# Patient Record
Sex: FEMALE | URBAN - METROPOLITAN AREA
[De-identification: names, ages, dates, MRNs, and addresses within clinical notes are randomized per-mention and may not be internally consistent; named-entity substitution may affect disease eponyms.]

---

## 2022-07-22 ENCOUNTER — OFFICE VISIT (OUTPATIENT)
Facility: LOCATION | Age: 72
End: 2022-07-22
Payer: MEDICARE

## 2022-07-22 DIAGNOSIS — E11.9 TYPE 2 DIABETES MELLITUS W/O COMPLICATION: ICD-10-CM

## 2022-07-22 DIAGNOSIS — H25.13 AGE-RELATED NUCLEAR CATARACT, BILATERAL: ICD-10-CM

## 2022-07-22 DIAGNOSIS — H40.033 ANATOMICAL NARROW ANGLE, BILATERAL: Primary | ICD-10-CM

## 2022-07-22 PROCEDURE — 92133 CPTRZD OPH DX IMG PST SGM ON: CPT | Performed by: OPTOMETRIST

## 2022-07-22 PROCEDURE — 76514 ECHO EXAM OF EYE THICKNESS: CPT | Performed by: OPTOMETRIST

## 2022-07-22 PROCEDURE — 92004 COMPRE OPH EXAM NEW PT 1/>: CPT | Performed by: OPTOMETRIST

## 2022-07-22 ASSESSMENT — INTRAOCULAR PRESSURE
OS: 19
OD: 18

## 2022-07-22 ASSESSMENT — KERATOMETRY
OS: 45.00
OD: 45.50

## 2022-07-22 ASSESSMENT — VISUAL ACUITY
OD: 20/25
OS: 20/25

## 2022-07-22 NOTE — IMPRESSION/PLAN
Impression: Type 2 diabetes mellitus w/o complication: S13.5. Plan: Diabetes type II: no background retinopathy, no signs of neovascularization noted. Discussed ocular and systemic benefits of blood sugar control.

## 2022-07-22 NOTE — IMPRESSION/PLAN
Impression: Anatomical narrow angle, bilateral: H40.033.
-s/p LPI OU Plan: Condition and IOP are stable today. No changes being made to current treatment at this time. Continue Latanoprost QHS OU. Emphasized and explained compliance. Reassured patient of current condition and treatment and discussed risks of glaucoma progression. Will continue to monitor IOP. RTC: 6 months IOP check

## 2023-01-24 ENCOUNTER — OFFICE VISIT (OUTPATIENT)
Facility: LOCATION | Age: 73
End: 2023-01-24
Payer: MEDICARE

## 2023-01-24 DIAGNOSIS — H04.123 TEAR FILM INSUFFICIENCY OF BILATERAL LACRIMAL GLANDS: ICD-10-CM

## 2023-01-24 DIAGNOSIS — H40.2234 CHRONIC ANGLE CLOSURE, BILATERAL, INDETERMINATE STAGE: Primary | ICD-10-CM

## 2023-01-24 DIAGNOSIS — H25.13 AGE-RELATED NUCLEAR CATARACT, BILATERAL: ICD-10-CM

## 2023-01-24 PROCEDURE — 99213 OFFICE O/P EST LOW 20 MIN: CPT | Performed by: OPTOMETRIST

## 2023-01-24 ASSESSMENT — INTRAOCULAR PRESSURE
OS: 20
OD: 22

## 2023-01-24 NOTE — IMPRESSION/PLAN
Impression: Chronic angle closure, bilateral, indeterminate stage: F66.8920. DEMENTIA
VF: UNABLE 
IOP: DIFFICULT  Plan: S/P LPI OU. Patient started on Latanoprost from outside provider. RNLF full . IOP trending. Continue Latanoprost QHS OU.